# Patient Record
(demographics unavailable — no encounter records)

---

## 2017-05-24 NOTE — MAMMOGRAPHY REPORT
DIGITAL BILATERAL SCREENING MAMMOGRAM:  05/23/2017

 

CLINICAL HISTORY:  A 65-year-old female in for routine screening mammogram.  
Patient has no family history of breast cancer.  Patient has had prior breast 
surgery.  Patient has had a left breast biopsy in approximately 2000.  Patient 
has breast implants and had also implant replacement surgery.

 

Patient has bilateral breast prostheses. 

 

COMPARISON:  12/26/2007, 12/15/2008, 01/06/2009, 07/21/2009, 12/15/2009, 12/16/
2010, 02/16/2011, 03/20/2012, 03/19/2013

 

TECHNIQUE:  Craniocaudad and oblique lateral views of each breast were obtained 
with Hologic Full Field digital mammography.  To compliment the exam, bilateral 
Anaid views were done in craniocaudad and oblique lateral positions. 

 

FINDINGS:  Bilateral retropectoral silicone breast prostheses are present.  
Moderately dense breasts are noted.  Some small scattered calcifications are 
present in the breasts most likely a result of focal fat necrosis.  The patient 
has had her implants replaced and, therefore, has had surgeries involving both 
breasts in addition to her left breast biopsy surgery.  Portions of the breast 
parenchyma especially on the right show some small focal areas of density 
adjacent to some of the calcifications.  These may represent scarring related 
to prior surgery or small silicone granulomas.  There are also two small 
densities in the left axilla, one of which may represent a small silicone 
granuloma.  There are also some faint densities resembling calcifications along 
the posterior, medial, and lateral aspect of the right breast that probably 
represents small silicone particles.  These should be followed with annual 
mammography to further confirm their benign etiology.

 

No significant masses are detected.  

 

IMPRESSION:  

1.  BILATERAL RETROGLANDULAR SILICONE BREAST PROSTHESES ARE NOTED.

 

2.  SMALL CALCIFICATIONS ARE NOTED IN THE BREASTS IN ASSOCIATION WITH SOME 
REACTION ADJACENT TO THEM.  THESE ARE MORE NUMEROUS IN THE RIGHT BREAST.  THESE 
MAY REPRESENT SMALL SILICONE GRANULOMAS OR FAT NECROSIS AS A RESULT OF PRIOR 
IMPLANT REPLACEMENT SURGERY.

 

BIRADS 2 - BENIGN.

 

RECOMMENDATIONS:  Annual bilateral screening mammography.

 

STANDARD QUALIFYING STATEMENTS

 

1. This examination was reviewed with the aid of Computer-Aided Detection (CAD).

 

2. A negative or benign imaging report should not delay biopsy if clinically 
suspicious findings are present. Consider surgical consultation if warranted. 
More than 5% of cancers are not identified by imaging.

 

3. Dense breasts may obscure an underlying neoplasm.

 

 

 

 

 

DD:05/24/2017 11:45:42  DT: 05/24/2017 12:01  JOB #: [id number]  EXT JOB #:
[id number]

CATHY

## 2017-06-13 NOTE — DEXA REPORT
DEXA SCAN: 06/13/2017



CLINICAL INDICATION: Postmenopausal. 



TECHNIQUE: Dual energy x-ray absorptiometry (DXA) was performed on a [employer] 
system. Regions measured are the AP spine, femoral neck, and, if needed, 
forearm.



COMPARISON: None. In accordance with the International Society for Clinical 
Densitometry (ISCD) guidelines, data from previous exams may be reanalyzed 
using current recommendations and techniques. This is done to allow a more 
accurate basis for comparison with the current study.



FINDINGS: The data for the lumbar spine is as follows:









REGION BMD (g/cm/cm) T-SCORE Z-SCORE

 

L1 0.928 -1.7 0.1

 

L2 1.185 -0.1 1.7

 

L3 1.184 -0.1 1.7

 

L4 1.291 0.8 2.6

 

TOTAL 1.151 -0.2 1.6





NOTE: All evaluable vertebrae are used for classification.



The data for the hip is as follows:







REGION BMD (g/cm/cm) T-SCORE Z-SCORE

 

Neck 0.745 -2.1 -0.5

 

TOTAL 0.705 -2.4 -1.0





NOTE: The femoral neck or total proximal femur, whichever is lowest, is used 
for classification.



IMPRESSION: THE WHO CLASSIFICATION BASED ON THE INTERNATIONAL REFERENCE 
STANDARD IS OSTEOPENIA. THE FRACTURE RISK IS INCREASED. 



RECOMMENDATION: Patients with diagnosis of osteoporosis or osteopenia should 
have regular bone mineral density assessment. For those eligible for Medicare, 
routine testing is allowed once every 2 years. Testing frequency can be 
increased for patients who have rapidly progressing disease or for those who 
are receiving medical therapy to restore bone mass. 



COMMENT: World Health Organization (WHO) definitions for osteoporosis and 
osteopenia:

NORMAL BMD: T-score at -1.0 or higher, fracture risk is low.

OSTEOPENIA BMD: T-score between -1.0 and -2.5, fracture risk is increased.

OSTEOPOROSIS BMD: T-score at -2.5 or lower, fracture risk high.



National Osteoporosis Foundation recommends:

1. Obtain adequate dietary calcium (at least 1200 mg per day) and vitamin D (400
-800 international units per day).

2. Participate, as appropriate, in regular weightbearing and muscle-
strengthening exercise. 

3. Avoid tobacco use and reduce alcohol and caffeine intake. 

4. For more detailed information see the website at www.NOF.org.
MTDD

## 2018-09-20 NOTE — XRAY REPORT
Reason:  ENCOUNTER FOR OTHER SPECIFIED SPECIAL EXAMINATIONS

Procedure Date:  09/20/2018   

Accession Number:  [id number] / [id number]                    

Procedure:  XR  - Chest 2 View X-Ray CPT Code:  76998

 

FULL RESULT:

 

 

EXAM:

CHEST RADIOGRAPHY

 

EXAM DATE: 9/20/2018 11:44 AM.

 

CLINICAL HISTORY: ENCOUNTER FOR OTHER SPECIFIED SPECIAL EXAMINATIONS.

 

COMPARISON: None.

 

TECHNIQUE: 2 views.

 

FINDINGS:

Lungs/Pleura: Possible nodule right lower lung field versus anterior rib. 

No focal opacities evident. No pleural effusion. No pneumothorax. Normal 

volumes. Right apical pleural thickening.

 

Mediastinum: Heart and mediastinal contours are unremarkable.

 

Other: Dextroscoliosis

IMPRESSION: Possible nodule right lower lung versus anterior rib

 

RADIA

## 2018-09-28 NOTE — MAMMOGRAPHY REPORT
Reason:  BILAT SCREEN IMPLANTS WITH VICTORINO

Procedure Date:  09/20/2018   

Accession Number:  [id number] / [id number]                    

Procedure:  KATHIE - Screening Mammo Impl w/Victorino CPT Code:  

 

FULL RESULT:

 

 

EXAM: Screening Mammo Impl w/Victorino

 

DATE: 9/20/2018 10:40 AM

 

CLINICAL HISTORY: 66 year-old nulliparous female with history of early 

menses and treatment of a left silicone granuloma.

 

TECHNIQUE: Bilateral implant displaced CC and MLO views as well as 

bilateral CC and MLO views were obtained.

 

COMPARISON: 5/23/2017, 3/19/2013, 3/20/2012, 2/16/2011.

 

FINDINGS:

The breasts demonstrate heterogeneously dense fibroglandular parenchyma 

bilaterally. Bilateral stable appearing prepectoral silicone implants are 

again demonstrated. There are bilateral typically benign coarse 

calcifications which are stable. Stable hyperdense nodules predominantly 

on the right with a have been proven to be silicone granuloma are 

redemonstrated. No suspicious masses, clustered microcalcifications, or 

regions of architectural distortion are identified.

 

IMPRESSION: Benign findings

 

RECOMMENDATION: Routine annual screening unless otherwise clinically 

indicated.

 

BIRADS CATEGORY 2: Benign findings

 

STANDARD QUALIFYING STATEMENTS:

1.  This examination was not reviewed with the aid of Computer-Aided 

Detection (CAD).

2.  A negative or benign  imaging report should not delay biopsy if 

clinically suspicious findings are present.  Consider surgical 

consultation if warrented.  More than 5% of cancers are not identified by 

imaging.

3.  Dense breasts may obscure an underlying neoplasm.

4. This examination was reviewed with the aid of 3D breast imaging 

(tomosynthesis).